# Patient Record
Sex: FEMALE | Race: WHITE | URBAN - METROPOLITAN AREA
[De-identification: names, ages, dates, MRNs, and addresses within clinical notes are randomized per-mention and may not be internally consistent; named-entity substitution may affect disease eponyms.]

---

## 2023-12-27 ENCOUNTER — OFFICE VISIT (OUTPATIENT)
Age: 28
End: 2023-12-27

## 2023-12-27 VITALS
SYSTOLIC BLOOD PRESSURE: 111 MMHG | TEMPERATURE: 98.2 F | HEART RATE: 102 BPM | BODY MASS INDEX: 25.65 KG/M2 | HEIGHT: 62 IN | OXYGEN SATURATION: 96 % | WEIGHT: 139.4 LBS | DIASTOLIC BLOOD PRESSURE: 77 MMHG

## 2023-12-27 DIAGNOSIS — J10.1 INFLUENZA B: Primary | ICD-10-CM

## 2023-12-27 DIAGNOSIS — R11.0 NAUSEA: ICD-10-CM

## 2023-12-27 LAB
INFLUENZA A ANTIBODY: ABNORMAL
INFLUENZA B ANTIBODY: ABNORMAL

## 2023-12-27 RX ORDER — ONDANSETRON 4 MG/1
4 TABLET, FILM COATED ORAL EVERY 8 HOURS PRN
Qty: 20 TABLET | Refills: 0 | Status: SHIPPED | OUTPATIENT
Start: 2023-12-27

## 2023-12-27 NOTE — PATIENT INSTRUCTIONS
Thank you for allowing us to care for you today and we hope you feel better soon.     New Prescriptions    ONDANSETRON (ZOFRAN) 4 MG TABLET    Take 1 tablet by mouth every 8 hours as needed for Nausea or Vomiting